# Patient Record
(demographics unavailable — no encounter records)

---

## 2025-03-19 NOTE — HEALTH RISK ASSESSMENT
[No] : No [Monthly or less (1 pt)] : Monthly or less (1 point) [0] : 2) Feeling down, depressed, or hopeless: Not at all (0)
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
## 2025-03-19 NOTE — ASSESSMENT
[FreeTextEntry1] :  CPE -Labs today, including CBC, CMP, A1c, lipids, TSH, and UA -Counseled on benefits of maintaining a balanced diet and exercise regimen -Needs formal physical next month  Asthma -Stable  Emplyment clearance -MMR, varicella titers -Quantiferon
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
## 2025-04-21 NOTE — ASSESSMENT
[Vaccines Reviewed] : Immunizations reviewed today. Please see immunization details in the vaccine log within the immunization flowsheet.  [FreeTextEntry1] :  CPE -Labs, including CBC, CMP, A1c, lipids, TSH, and UA, reviewed from last visit -Up to date on vaccines, but does not want Prevnar -Counseled on benefits of maintaining a balanced diet and exercise regimen -Follow up in 1 year or PRN  Asthma -Controlled -Sent rescue inhaler

## 2025-04-21 NOTE — COUNSELING
[Encouraged to maintain food diary] : Encouraged to maintain food diary [Encouraged to increase physical activity] : Encouraged to increase physical activity [Encouraged to use exercise tracking device] : Encouraged to use exercise tracking device No No No

## 2025-04-21 NOTE — HEALTH RISK ASSESSMENT
[Good] : ~his/her~  mood as  good [No] : No [0] : 2) Feeling down, depressed, or hopeless: Not at all (0) [PHQ-2 Negative - No further assessment needed] : PHQ-2 Negative - No further assessment needed [Never] : Never [With Family] : lives with family [Unemployed] : unemployed [Fully functional (bathing, dressing, toileting, transferring, walking, feeding)] : Fully functional (bathing, dressing, toileting, transferring, walking, feeding) [Fully functional (using the telephone, shopping, preparing meals, housekeeping, doing laundry, using] : Fully functional and needs no help or supervision to perform IADLs (using the telephone, shopping, preparing meals, housekeeping, doing laundry, using transportation, managing medications and managing finances) [Patient/Caregiver not ready to engage] : , patient/caregiver not ready to engage [No falls in past year] : Patient reported no falls in the past year [Little interest or pleasure doing things] : 1) Little interest or pleasure doing things [Feeling down, depressed, or hopeless] : 2) Feeling down, depressed, or hopeless [[id number]] : 0 [NO] : No [HIV test declined] : HIV test declined [Hepatitis C test declined] : Hepatitis C test declined [Change in mental status noted] : No change in mental status noted [High School] : high school [Single] : single [Sexually Active] : not sexually active [High Risk Behavior] : no high risk behavior [Reports changes in vision] : Reports no changes in vision [Reports normal functional visual acuity (ie: able to read med bottle)] : Reports poor functional visual acuity.  [Reports changes in dental health] : Reports no changes in dental health [Smoke Detector] : smoke detector [Safety elements used in home] : safety elements used in home [Seat Belt] :  uses seat belt [AdvancecareDate] : 4/2025